# Patient Record
Sex: MALE | Race: OTHER | HISPANIC OR LATINO
[De-identification: names, ages, dates, MRNs, and addresses within clinical notes are randomized per-mention and may not be internally consistent; named-entity substitution may affect disease eponyms.]

---

## 2023-04-21 PROBLEM — Z00.00 ENCOUNTER FOR PREVENTIVE HEALTH EXAMINATION: Status: ACTIVE | Noted: 2023-04-21

## 2023-04-23 ENCOUNTER — NON-APPOINTMENT (OUTPATIENT)
Age: 33
End: 2023-04-23

## 2023-04-24 ENCOUNTER — APPOINTMENT (OUTPATIENT)
Dept: SURGERY | Facility: CLINIC | Age: 33
End: 2023-04-24
Payer: COMMERCIAL

## 2023-04-24 VITALS
WEIGHT: 187 LBS | SYSTOLIC BLOOD PRESSURE: 115 MMHG | HEART RATE: 52 BPM | BODY MASS INDEX: 30.05 KG/M2 | HEIGHT: 66 IN | DIASTOLIC BLOOD PRESSURE: 76 MMHG

## 2023-04-24 DIAGNOSIS — Z78.9 OTHER SPECIFIED HEALTH STATUS: ICD-10-CM

## 2023-04-24 PROCEDURE — 99203 OFFICE O/P NEW LOW 30 MIN: CPT | Mod: 57

## 2023-04-24 PROCEDURE — 12031 INTMD RPR S/A/T/EXT 2.5 CM/<: CPT

## 2023-04-24 PROCEDURE — 11403 EXC TR-EXT B9+MARG 2.1-3CM: CPT

## 2023-04-24 NOTE — ASSESSMENT
[FreeTextEntry1] : Mr. MILNER is a 32 year y/o M, with a mass, of the mid chest wall, measuring approx 2.8 cm in size. Patient wants to have it removed today.

## 2023-04-24 NOTE — CONSULT LETTER
[Dear  ___] : Dear  [unfilled], [Consult Letter:] : I had the pleasure of evaluating your patient, [unfilled]. [Consult Closing:] : Thank you very much for allowing me to participate in the care of this patient.  If you have any questions, please do not hesitate to contact me. [Sincerely,] : Sincerely, [FreeTextEntry3] : Ernst Aragon MD\par

## 2023-04-24 NOTE — PLAN
[FreeTextEntry1] : Preop diagnosis: mass, mid chest wall\par Postop diagnosis: same as above\par Procedure: excision of mid chest wall, mass \par \par The area was cleaned with Betadine. The area was infiltrated with local, 1% lidocaine. The lesion was excised in an elliptical fashion. The area was closed in layers. Dressings placed. Patient tolerated the procedure well. Specimen sent for pathology after obtaining signed consent. \par Postoperative instructions were given to the patient.  RTC next week for removal of sutures.\par

## 2023-04-24 NOTE — HISTORY OF PRESENT ILLNESS
[de-identified] : RITCHIE MILNER is a 32 year old M who is referred to the office for consultation visit, he presents w the cc of having a lump, to the mid upper chest wall. He states the lump has been there for some time and is bothersome. No pain tot he area. He wants to have it removed in the office, today.

## 2023-04-24 NOTE — PHYSICAL EXAM
[Alert] : alert [Oriented to Person] : oriented to person [Oriented to Place] : oriented to place [Oriented to Time] : oriented to time [Calm] : calm [de-identified] : A/Ox3; NAD. appears comfortable [de-identified] : EOMI; sclera anicteric. [de-identified] : no cervical lymphadenopathy  [de-identified] : airway patent, no use of accessory muscles [de-identified] : abd is soft, NT/ND\par  [de-identified] : palpable  mass, mid chest wall, approx 2.8 x 2 cm in size

## 2023-05-04 ENCOUNTER — APPOINTMENT (OUTPATIENT)
Dept: SURGERY | Facility: CLINIC | Age: 33
End: 2023-05-04
Payer: COMMERCIAL

## 2023-05-04 DIAGNOSIS — R22.2 LOCALIZED SWELLING, MASS AND LUMP, TRUNK: ICD-10-CM

## 2023-05-04 LAB — CORE LAB BIOPSY: NORMAL

## 2023-05-04 PROCEDURE — 99024 POSTOP FOLLOW-UP VISIT: CPT

## 2023-05-04 NOTE — PLAN
[FreeTextEntry1] : sutures removed, dressing placed\par patient will follow up if needed. Warning signs, follow up, and restrictions were discussed with the patient.\par \par Patient's questions and concerns addressed to their satisfaction, and patient verbalized an understanding of the information discussed.\par

## 2023-05-04 NOTE — PHYSICAL EXAM
[Alert] : alert [Oriented to Person] : oriented to person [Oriented to Place] : oriented to place [Oriented to Time] : oriented to time [Calm] : calm [de-identified] : A/Ox3; NAD. appears comfortable [de-identified] : incision site is healing well, no evidence of acute inflammation or infection ; sutures intact

## 2023-05-04 NOTE — REASON FOR VISIT
[Follow-Up: _____] : a [unfilled] follow-up visit [FreeTextEntry1] : S/P excision of mid chest wall, mass, in the office, 04/24/23

## 2023-05-04 NOTE — ASSESSMENT
[FreeTextEntry1] : Mr. MILNER is a 32 year y/o M, S/P excision of mid chest wall, mass, in the office, 04/24/23. \par \par Patient is doing well, with excellent post-operative recovery. Incision site is healing well and as expected. There is no evidence of infection or complication, and patient is progressing as expected.\par

## 2023-05-04 NOTE — HISTORY OF PRESENT ILLNESS
[de-identified] : RITCHIE MILNER presents to the office for postoperative visit today, he is S/P excision of mid chest wall, mass, in the office, 04/24/23. Patient states he is feeling well, no reported complaints. No pain.